# Patient Record
Sex: FEMALE | Race: WHITE | NOT HISPANIC OR LATINO | URBAN - METROPOLITAN AREA
[De-identification: names, ages, dates, MRNs, and addresses within clinical notes are randomized per-mention and may not be internally consistent; named-entity substitution may affect disease eponyms.]

---

## 2018-03-06 ENCOUNTER — OFFICE (OUTPATIENT)
Dept: URBAN - METROPOLITAN AREA CLINIC 78 | Facility: CLINIC | Age: 58
End: 2018-03-06

## 2018-03-06 VITALS
TEMPERATURE: 98.3 F | DIASTOLIC BLOOD PRESSURE: 96 MMHG | WEIGHT: 133 LBS | HEART RATE: 62 BPM | HEIGHT: 63 IN | SYSTOLIC BLOOD PRESSURE: 149 MMHG

## 2018-03-06 DIAGNOSIS — R19.4 CHANGE IN BOWEL HABIT: ICD-10-CM

## 2018-03-06 DIAGNOSIS — R19.8 OTHER SPECIFIED SYMPTOMS AND SIGNS INVOLVING THE DIGESTIVE S: ICD-10-CM

## 2018-03-06 PROCEDURE — 99214 OFFICE O/P EST MOD 30 MIN: CPT

## 2018-03-06 PROCEDURE — 00031: CPT

## 2018-03-06 NOTE — SERVICEHPINOTES
56 yo female presents with complaint of change in bowel habits. Patient started having some symptoms last summer after her father-in-law (who has dementia) moved into her house and stress levels went up. At the same time she also started exercising and made dietary changes - increased fruits. Stool form has changed to flatter form and she can sometimes feel rectal pressure and urge to have a BM when not really needing to. No rectal pain. She has one BM daily - after her morning coffee. Denies blood in stools. Denies significant gas/bloating issues. Has increased her fiber intake recently and feels there has been some improvement in stools. Has lost over 30 pounds with the changes she's made. Patient had a screening colonoscopy in December 2015 showing diverticula but was otherwise normal. Her maternal uncle had colon cancer. She reports rectal exam at PCP's office and was told she has hemorrhoids.

## 2018-03-22 ENCOUNTER — OFFICE (OUTPATIENT)
Dept: URBAN - METROPOLITAN AREA CLINIC 32 | Facility: CLINIC | Age: 58
End: 2018-03-22

## 2018-03-22 VITALS
DIASTOLIC BLOOD PRESSURE: 67 MMHG | HEART RATE: 69 BPM | RESPIRATION RATE: 18 BRPM | SYSTOLIC BLOOD PRESSURE: 106 MMHG | TEMPERATURE: 98.2 F | RESPIRATION RATE: 9 BRPM | HEART RATE: 78 BPM | SYSTOLIC BLOOD PRESSURE: 95 MMHG | OXYGEN SATURATION: 98 % | DIASTOLIC BLOOD PRESSURE: 63 MMHG | HEIGHT: 63 IN | TEMPERATURE: 97.5 F | OXYGEN SATURATION: 99 % | HEART RATE: 65 BPM | OXYGEN SATURATION: 100 % | DIASTOLIC BLOOD PRESSURE: 72 MMHG | RESPIRATION RATE: 12 BRPM | RESPIRATION RATE: 16 BRPM | SYSTOLIC BLOOD PRESSURE: 113 MMHG | WEIGHT: 133 LBS | DIASTOLIC BLOOD PRESSURE: 70 MMHG | DIASTOLIC BLOOD PRESSURE: 79 MMHG | RESPIRATION RATE: 14 BRPM | DIASTOLIC BLOOD PRESSURE: 59 MMHG | HEART RATE: 68 BPM | SYSTOLIC BLOOD PRESSURE: 94 MMHG | OXYGEN SATURATION: 97 % | SYSTOLIC BLOOD PRESSURE: 123 MMHG | SYSTOLIC BLOOD PRESSURE: 117 MMHG

## 2018-03-22 DIAGNOSIS — R19.8 OTHER SPECIFIED SYMPTOMS AND SIGNS INVOLVING THE DIGESTIVE S: ICD-10-CM

## 2018-03-22 DIAGNOSIS — R19.4 CHANGE IN BOWEL HABIT: ICD-10-CM

## 2018-03-22 PROBLEM — K64.8 OTHER HEMORRHOIDS: Status: ACTIVE | Noted: 2018-03-22

## 2018-03-22 PROBLEM — K64.0 FIRST DEGREE HEMORRHOIDS: Status: ACTIVE | Noted: 2018-03-22

## 2018-03-22 PROCEDURE — 45378 DIAGNOSTIC COLONOSCOPY: CPT

## 2018-05-11 ENCOUNTER — OFFICE (OUTPATIENT)
Dept: URBAN - METROPOLITAN AREA CLINIC 78 | Facility: CLINIC | Age: 58
End: 2018-05-11

## 2018-05-11 VITALS
TEMPERATURE: 98.1 F | WEIGHT: 134 LBS | DIASTOLIC BLOOD PRESSURE: 61 MMHG | HEIGHT: 63 IN | SYSTOLIC BLOOD PRESSURE: 112 MMHG | HEART RATE: 61 BPM

## 2018-05-11 DIAGNOSIS — R19.4 CHANGE IN BOWEL HABIT: ICD-10-CM

## 2018-05-11 PROCEDURE — 99214 OFFICE O/P EST MOD 30 MIN: CPT

## 2018-05-11 NOTE — SERVICEHPINOTES
56 yo female presents for f/u change in bowel habits. Her recent colonoscopy showed mild diverticulosis and hemorrhoids. She has found that dairy products might be a trigger for her. Otherwise hasn't tried the probiotic or IBgard samples yet. She did try a fiber supplement but not for very long so is not sure if it helped. She has one BM daily, occasionally 2x/day. Stools can be formed or loose. She had normal CBC, CMP in April and giardia ag negative, stool culture neg. TSH is low at 0.22 - she is on medication. Her weight is stable. No other concerns today.